# Patient Record
(demographics unavailable — no encounter records)

---

## 2025-03-13 NOTE — HISTORY OF PRESENT ILLNESS
[FreeTextEntry1] : annual  [de-identified] : 36-year-old female here for annual well visit. patent works out 5 days a week but cannot lose weight. patient works remotely states she is always snacking and has large portions. patient would like to try oral medications discussed phentermine went over risks and benefits patient verbalized understanding. still having back pain from breast would like a reduction. patient denies any chest pain no sob no abdominal pain

## 2025-03-13 NOTE — HEALTH RISK ASSESSMENT
[Good] : ~his/her~  mood as  good [Yes] : Yes [Never] : Never [Patient reported PAP Smear was normal] : Patient reported PAP Smear was normal [With Family] : lives with family [Employed] : employed [] :  [# Of Children ___] : has [unfilled] children

## 2025-07-17 NOTE — HISTORY OF PRESENT ILLNESS
[FreeTextEntry1] : follow up  [de-identified] : 37-year-old female here for follow up. patient states she's doing great on phentermine she is no longer snacking her appetite has decreased. patient lost weight doing well. denies any side effects no chest pain no palpitations no gi issues. feels great overall.